# Patient Record
(demographics unavailable — no encounter records)

---

## 2024-10-31 NOTE — PHYSICAL EXAM
[Normocephalic] : normocephalic [Atraumatic] : atraumatic [EOMI] : extra ocular movement intact [Sclera nonicteric] : sclera nonicteric [Supple] : supple [No Supraclavicular Adenopathy] : no supraclavicular adenopathy [No Cervical Adenopathy] : no cervical adenopathy [No Thyromegaly] : no thyromegaly [Examined in the supine and seated position] : examined in the supine and seated position [No Axillary Lymphadenopathy] : no left axillary lymphadenopathy [No Edema] : no edema [No Rashes] : no rashes [No Ulceration] : no ulceration [de-identified] : s/p bilateral mastectomies with prepectoral implant reconstruction. No breast masses. Upper mid sternal palpable mass palpable 3.8 x 3.2 cm without change, c/w biopsy proven metastasis.  [de-identified] : Mediport of right upper chest wall.

## 2024-10-31 NOTE — HISTORY OF PRESENT ILLNESS
[FreeTextEntry1] : Patient is a 62 year old female here today for a follow up office visit No family history of breast cancer.  11/16/2009 s/p bilateral skin sparing total mastectomies with left axillary sentinel lymph node biopsy and bilateral implant reconstruction;  Path Stage I ([id number]) ER positive 98%/AR positive 95%/Mgu5qac negative.  12/1/2009 Oncotype Dx recurrence score 6 Formerly saw med onc Dr. Hunter; she took Tamoxifen 3/2010 underwent exchange of implants with Dr. Celaya.  12/5/2018 Genetic testing: APC heterozygous (increased risk colorectal cancer).  10/14/2020 Bilateral breast ultrasound: No suspicious abnormalities were seen sonographically in either breast. There are bilateral grossly intact silicone implants. Clinical correlation of both breasts is recommended. Bi-rads 1.  10/10/2022 Bilateral breast ultrasound:  Right breast- No suspicious abnormalities were seen sonographically in the right breast. The right implant appears intact.  Left breast- No suspicious abnormalities were seen sonographically in the left breast. There are areas of shadowing suggestive of the snowstorm effect obscuring the implant contour on the left most notable in the 2-3:00 location suggestive of probable extracapsular implant rupture. Clinical correlation and breast MRI would be prudent. Bi-rads 2.  Plastic Surgeon: Dr. Celaya, she had the bilateral prepectoral implant exchange 12/12/2022, saw him 4/2023. 6/2023 she noticed a golf-ball sized lump of her upper chest wall.  6/16/2023 Left ultrasound: The palpable area of concern corresponds to a lobulated hypoechoic mass which appears to be within muscle along the upper medial left chest wall. Further evaluation with a chest CT scan with contrast is recommended. 6/17/2023 CT chest: Intrathoracic lymphadenopathy, including conglomerate anterior mediastinal mass which involves the left internal mammary chain, and invades the overlying chest wall, and erodes the sternum. Multiple indeterminate lung nodules, largest 9 mm in the right lower lobe. 6/23/2023 - FNA/core chest wall lesion positive for metastatic carcinoma c/w breast primary; ER-/AR-/Her2 negative 6/29/2023 - PET/CT: hypermetabolic mediastinal/prevascular space shanae mass invading anterior chest wall and abutting sternum; hypermetabolic right hilar, left internal mammary and left para aortic nodes; hypermetabolic elongated density along posterolateral left breast implant; subcentimeter pulmonary nodules below PET resolution 3/2024 CT stable disease without size change to suggest progression of disease. (see report for details) 4/2024- MRI brain negative 4/17/24 CT Chest/Abd/Pelvis: stable disease (see report) Med Onc: Dr. Kelly, on chemotherapy with Trodelvy (ASCENT-03 trial) and Xgeva for bone metastases. The Xgeva has been on hold due to recent gum surgery. She sees her q 4 weeks. She states that she last had a CAT scan of her chest 9/2024 which reportedly revealed that the mediastinal mass is stable and that one of the lymph nodes resolved.  She denies any chest wall pain. She states that in 4/2024 she broke her right tibia when she fell at the gym.  She had a cast for 8 weeks.  She had a deep venous thrombosis for which she took Eliquis for 3 months.

## 2024-10-31 NOTE — PHYSICAL EXAM
[Normocephalic] : normocephalic [Atraumatic] : atraumatic [EOMI] : extra ocular movement intact [Sclera nonicteric] : sclera nonicteric [Supple] : supple [No Supraclavicular Adenopathy] : no supraclavicular adenopathy [No Cervical Adenopathy] : no cervical adenopathy [No Thyromegaly] : no thyromegaly [Examined in the supine and seated position] : examined in the supine and seated position [No Axillary Lymphadenopathy] : no left axillary lymphadenopathy [No Edema] : no edema [No Rashes] : no rashes [No Ulceration] : no ulceration [de-identified] : s/p bilateral mastectomies with prepectoral implant reconstruction. No breast masses. Upper mid sternal palpable mass palpable 3.8 x 3.2 cm without change, c/w biopsy proven metastasis.  [de-identified] : Mediport of right upper chest wall.

## 2024-10-31 NOTE — CONSULT LETTER
[Dear  ___] : Dear  [unfilled], [Courtesy Letter:] : I had the pleasure of seeing your patient, [unfilled], in my office today. [Please see my note below.] : Please see my note below. [Sincerely,] : Sincerely, [DrSj  ___] : Dr. FOSTER [DrSj ___] : Dr. FOSTER [FreeTextEntry3] : Susan M. Palleschi, MD, FACS Division of Breast Surgery Director, Breast Surgery [address] [address] (Phone) [phone] (Fax) [phone]

## 2024-10-31 NOTE — PHYSICAL EXAM
[Normocephalic] : normocephalic [Atraumatic] : atraumatic [EOMI] : extra ocular movement intact [Sclera nonicteric] : sclera nonicteric [Supple] : supple [No Supraclavicular Adenopathy] : no supraclavicular adenopathy [No Cervical Adenopathy] : no cervical adenopathy [No Thyromegaly] : no thyromegaly [Examined in the supine and seated position] : examined in the supine and seated position [No Axillary Lymphadenopathy] : no left axillary lymphadenopathy [No Edema] : no edema [No Rashes] : no rashes [No Ulceration] : no ulceration [de-identified] : s/p bilateral mastectomies with prepectoral implant reconstruction. No breast masses. Upper mid sternal palpable mass palpable 3.8 x 3.2 cm without change, c/w biopsy proven metastasis.  [de-identified] : Mediport of right upper chest wall.

## 2024-10-31 NOTE — HISTORY OF PRESENT ILLNESS
[FreeTextEntry1] : Patient is a 62 year old female here today for a follow up office visit No family history of breast cancer.  11/16/2009 s/p bilateral skin sparing total mastectomies with left axillary sentinel lymph node biopsy and bilateral implant reconstruction;  Path Stage I ([id number]) ER positive 98%/FL positive 95%/Lke9dgk negative.  12/1/2009 Oncotype Dx recurrence score 6 Formerly saw med onc Dr. Hunter; she took Tamoxifen 3/2010 underwent exchange of implants with Dr. Celaya.  12/5/2018 Genetic testing: APC heterozygous (increased risk colorectal cancer).  10/14/2020 Bilateral breast ultrasound: No suspicious abnormalities were seen sonographically in either breast. There are bilateral grossly intact silicone implants. Clinical correlation of both breasts is recommended. Bi-rads 1.  10/10/2022 Bilateral breast ultrasound:  Right breast- No suspicious abnormalities were seen sonographically in the right breast. The right implant appears intact.  Left breast- No suspicious abnormalities were seen sonographically in the left breast. There are areas of shadowing suggestive of the snowstorm effect obscuring the implant contour on the left most notable in the 2-3:00 location suggestive of probable extracapsular implant rupture. Clinical correlation and breast MRI would be prudent. Bi-rads 2.  Plastic Surgeon: Dr. Celaya, she had the bilateral prepectoral implant exchange 12/12/2022, saw him 4/2023. 6/2023 she noticed a golf-ball sized lump of her upper chest wall.  6/16/2023 Left ultrasound: The palpable area of concern corresponds to a lobulated hypoechoic mass which appears to be within muscle along the upper medial left chest wall. Further evaluation with a chest CT scan with contrast is recommended. 6/17/2023 CT chest: Intrathoracic lymphadenopathy, including conglomerate anterior mediastinal mass which involves the left internal mammary chain, and invades the overlying chest wall, and erodes the sternum. Multiple indeterminate lung nodules, largest 9 mm in the right lower lobe. 6/23/2023 - FNA/core chest wall lesion positive for metastatic carcinoma c/w breast primary; ER-/FL-/Her2 negative 6/29/2023 - PET/CT: hypermetabolic mediastinal/prevascular space shanae mass invading anterior chest wall and abutting sternum; hypermetabolic right hilar, left internal mammary and left para aortic nodes; hypermetabolic elongated density along posterolateral left breast implant; subcentimeter pulmonary nodules below PET resolution 3/2024 CT stable disease without size change to suggest progression of disease. (see report for details) 4/2024- MRI brain negative 4/17/24 CT Chest/Abd/Pelvis: stable disease (see report) Med Onc: Dr. Kelly, on chemotherapy with Trodelvy (ASCENT-03 trial) and Xgeva for bone metastases. The Xgeva has been on hold due to recent gum surgery. She sees her q 4 weeks. She states that she last had a CAT scan of her chest 9/2024 which reportedly revealed that the mediastinal mass is stable and that one of the lymph nodes resolved.  She denies any chest wall pain. She states that in 4/2024 she broke her right tibia when she fell at the gym.  She had a cast for 8 weeks.  She had a deep venous thrombosis for which she took Eliquis for 3 months.

## 2024-10-31 NOTE — HISTORY OF PRESENT ILLNESS
[FreeTextEntry1] : Patient is a 62 year old female here today for a follow up office visit No family history of breast cancer.  11/16/2009 s/p bilateral skin sparing total mastectomies with left axillary sentinel lymph node biopsy and bilateral implant reconstruction;  Path Stage I ([id number]) ER positive 98%/NC positive 95%/Ihi3bsf negative.  12/1/2009 Oncotype Dx recurrence score 6 Formerly saw med onc Dr. Hunter; she took Tamoxifen 3/2010 underwent exchange of implants with Dr. Celaya.  12/5/2018 Genetic testing: APC heterozygous (increased risk colorectal cancer).  10/14/2020 Bilateral breast ultrasound: No suspicious abnormalities were seen sonographically in either breast. There are bilateral grossly intact silicone implants. Clinical correlation of both breasts is recommended. Bi-rads 1.  10/10/2022 Bilateral breast ultrasound:  Right breast- No suspicious abnormalities were seen sonographically in the right breast. The right implant appears intact.  Left breast- No suspicious abnormalities were seen sonographically in the left breast. There are areas of shadowing suggestive of the snowstorm effect obscuring the implant contour on the left most notable in the 2-3:00 location suggestive of probable extracapsular implant rupture. Clinical correlation and breast MRI would be prudent. Bi-rads 2.  Plastic Surgeon: Dr. Celaya, she had the bilateral prepectoral implant exchange 12/12/2022, saw him 4/2023. 6/2023 she noticed a golf-ball sized lump of her upper chest wall.  6/16/2023 Left ultrasound: The palpable area of concern corresponds to a lobulated hypoechoic mass which appears to be within muscle along the upper medial left chest wall. Further evaluation with a chest CT scan with contrast is recommended. 6/17/2023 CT chest: Intrathoracic lymphadenopathy, including conglomerate anterior mediastinal mass which involves the left internal mammary chain, and invades the overlying chest wall, and erodes the sternum. Multiple indeterminate lung nodules, largest 9 mm in the right lower lobe. 6/23/2023 - FNA/core chest wall lesion positive for metastatic carcinoma c/w breast primary; ER-/NC-/Her2 negative 6/29/2023 - PET/CT: hypermetabolic mediastinal/prevascular space shanae mass invading anterior chest wall and abutting sternum; hypermetabolic right hilar, left internal mammary and left para aortic nodes; hypermetabolic elongated density along posterolateral left breast implant; subcentimeter pulmonary nodules below PET resolution 3/2024 CT stable disease without size change to suggest progression of disease. (see report for details) 4/2024- MRI brain negative 4/17/24 CT Chest/Abd/Pelvis: stable disease (see report) Med Onc: Dr. Kelly, on chemotherapy with Trodelvy (ASCENT-03 trial) and Xgeva for bone metastases. The Xgeva has been on hold due to recent gum surgery. She sees her q 4 weeks. She states that she last had a CAT scan of her chest 9/2024 which reportedly revealed that the mediastinal mass is stable and that one of the lymph nodes resolved.  She denies any chest wall pain. She states that in 4/2024 she broke her right tibia when she fell at the gym.  She had a cast for 8 weeks.  She had a deep venous thrombosis for which she took Eliquis for 3 months.

## 2024-10-31 NOTE — ASSESSMENT
[FreeTextEntry1] : History of left breast cancer, s/p bilateral mastectomies with implant reconstruction (2009) now metastatic disease Invading anterior chest wall abutting sternum and metastatic to right hilar, left internal mammary and left para aortic lymph nodes  1. Continue chemotherapy and follow up with Dr. Kelly 2. Follow up office visit 4/2025 3. Advised monthly self breast examinations and advised her to contact me if she has any concerns.  4. Gave her card for Dr. Tyler Pena, psychotherapist .

## 2024-10-31 NOTE — CONSULT LETTER
[Dear  ___] : Dear  [unfilled], [Courtesy Letter:] : I had the pleasure of seeing your patient, [unfilled], in my office today. [Please see my note below.] : Please see my note below. [Sincerely,] : Sincerely, [DrSj  ___] : Dr. FOSTER [DrSj ___] : Dr. FOSTER [FreeTextEntry3] : Susan M. Palleschi, MD, FACS Division of Breast Surgery Director, Breast Surgery [address] [address] (Phone) [phone] (Fax) [phone]

## 2024-10-31 NOTE — CONSULT LETTER
[Dear  ___] : Dear  [unfilled], [Courtesy Letter:] : I had the pleasure of seeing your patient, [unfilled], in my office today. [Please see my note below.] : Please see my note below. [Sincerely,] : Sincerely, [DrSj  ___] : Dr. FOSTER [DrSj ___] : Dr. FOSTER [FreeTextEntry3] : Susan M. Palleschi, MD, FACS Division of Breast Surgery Director, Breast Surgery [address] [address] (Phone) [phone] (Fax) [phone]

## 2025-04-28 NOTE — HISTORY OF PRESENT ILLNESS
[FreeTextEntry1] : Patient is a 63 year old female here today for a follow up office visit No family history of breast cancer.  11/16/2009 s/p bilateral skin sparing total mastectomies with left axillary sentinel lymph node biopsy and bilateral implant reconstruction;  Path Stage I ([id number]) ER positive 98%/MD positive 95%/Vvm0awa negative.  12/1/2009 Oncotype Dx recurrence score 6 Formerly saw med onc Dr. Hunter; she took Tamoxifen 3/2010 underwent exchange of implants with Dr. Celaya.  12/5/2018 Genetic testing: APC heterozygous (increased risk colorectal cancer).  10/14/2020 Bilateral breast ultrasound: No suspicious abnormalities were seen sonographically in either breast. There are bilateral grossly intact silicone implants. Clinical correlation of both breasts is recommended. Bi-rads 1.  10/10/2022 Bilateral breast ultrasound:  Right breast- No suspicious abnormalities were seen sonographically in the right breast. The right implant appears intact.  Left breast- No suspicious abnormalities were seen sonographically in the left breast. There are areas of shadowing suggestive of the snowstorm effect obscuring the implant contour on the left most notable in the 2-3:00 location suggestive of probable extracapsular implant rupture. Clinical correlation and breast MRI would be prudent. Bi-rads 2.  Plastic Surgeon: Dr. Celaya, she had the bilateral prepectoral implant exchange 12/12/2022, saw him 4/2023. 6/2023 she noticed a golf-ball sized lump of her upper chest wall.  6/16/2023 Left ultrasound: The palpable area of concern corresponds to a lobulated hypoechoic mass which appears to be within muscle along the upper medial left chest wall. Further evaluation with a chest CT scan with contrast is recommended. 6/17/2023 CT chest: Intrathoracic lymphadenopathy, including conglomerate anterior mediastinal mass which involves the left internal mammary chain, and invades the overlying chest wall, and erodes the sternum. Multiple indeterminate lung nodules, largest 9 mm in the right lower lobe. 6/23/2023 - FNA/core chest wall lesion positive for metastatic carcinoma c/w breast primary; ER-/MD-/Her2 negative 6/29/2023 - PET/CT: hypermetabolic mediastinal/prevascular space shanae mass invading anterior chest wall and abutting sternum; hypermetabolic right hilar, left internal mammary and left para aortic nodes; hypermetabolic elongated density along posterolateral left breast implant; subcentimeter pulmonary nodules below PET resolution 4/2024- MRI brain negative She states that in 4/2024 she broke her right tibia when she fell at the gym.  She had a cast for 8 weeks.  She had a deep venous thrombosis for which she took Eliquis for 3 months. Med Onc: Dr. Kelly, on chemotherapy with Trodelvy (ASCENT-03 trial) and Xgeva for bone metastases. She saw her 3/2025, she will see her later this week. Her chest CT C/A/P revealed mild enlargement of the mediastinal mass previous 5.2 x 2.4 cm increased to 5.6 x 2.9 cm (per Dr. Kelly's note). She will undergo a repeat CT on 5/7/2025.  Diagnosed with squamous cell carcinoma, had excision in 2/2025. over right upper inner chest  She sees Dr. Moeller in palliative care, using medical marijuana. Rad onc: She met with Dr. Kirill Winkler ([facility] – [place], [place]) to discuss the potential role of palliative radiation therapy to the sternum.  The patient states that because she is asymptomatic, Dr. Winkler is going to continue observation without treatment for now.

## 2025-04-28 NOTE — CONSULT LETTER
[Dear  ___] : Dear  [unfilled], [Courtesy Letter:] : I had the pleasure of seeing your patient, [unfilled], in my office today. [Please see my note below.] : Please see my note below. [Sincerely,] : Sincerely, [DrSj  ___] : Dr. FOSTER [DrSj ___] : Dr. FOSTER [___] : [unfilled] [FreeTextEntry3] : Susan M. Palleschi, MD, FACS Division of Breast Surgery Director, Breast Surgery [address] [address] (Phone) [phone] (Fax) [phone]

## 2025-04-28 NOTE — ASSESSMENT
[FreeTextEntry1] : History of left breast cancer, s/p bilateral mastectomies with implant reconstruction (2009) now metastatic disease Invading anterior chest wall abutting sternum and metastatic to right hilar, left internal mammary and left para aortic lymph nodes Clinically the sternal mass has enlarged, also increased in size on CT report per Dr. Kelly's note.  1. Continue chemotherapy and follow up with Dr. Kelly and palliative care 2. Follow up office visit 10/2025. 3. Advised monthly self breast examinations and advised her to contact me if she has any concerns.  4. She is scheduled to undergo a CT 5/2025.   The patient was seen and examined in the presence of Jane Kingsley NP. .

## 2025-04-28 NOTE — CONSULT LETTER
[Dear  ___] : Dear  [unfilled], [Courtesy Letter:] : I had the pleasure of seeing your patient, [unfilled], in my office today. [Please see my note below.] : Please see my note below. [Sincerely,] : Sincerely, [DrSj  ___] : Dr. FOSTER [DrSj ___] : Dr. FOSTER [___] : [unfilled] [FreeTextEntry3] : Susan M. Palleschi, MD, FACS Division of Breast Surgery Director, Breast Surgery [address] [address] (Phone) [phone] (Fax) [phone]

## 2025-04-28 NOTE — PHYSICAL EXAM
[Normocephalic] : normocephalic [Atraumatic] : atraumatic [EOMI] : extra ocular movement intact [Sclera nonicteric] : sclera nonicteric [Supple] : supple [No Supraclavicular Adenopathy] : no supraclavicular adenopathy [No Cervical Adenopathy] : no cervical adenopathy [No Thyromegaly] : no thyromegaly [Examined in the supine and seated position] : examined in the supine and seated position [No Axillary Lymphadenopathy] : no left axillary lymphadenopathy [No Edema] : no edema [No Rashes] : no rashes [No Ulceration] : no ulceration [de-identified] : s/p bilateral mastectomies with prepectoral implant reconstruction. No breast masses. Upper mid sternal palpable mass palpable 4.5 x 6.0 cm increased from 3.8 x 3.2 cm, c/w biopsy proven metastasis. Overlying skin is intact. [de-identified] : Mediport of right upper chest wall.

## 2025-04-28 NOTE — PHYSICAL EXAM
[Normocephalic] : normocephalic [Atraumatic] : atraumatic [EOMI] : extra ocular movement intact [Sclera nonicteric] : sclera nonicteric [Supple] : supple [No Supraclavicular Adenopathy] : no supraclavicular adenopathy [No Cervical Adenopathy] : no cervical adenopathy [No Thyromegaly] : no thyromegaly [Examined in the supine and seated position] : examined in the supine and seated position [No Axillary Lymphadenopathy] : no left axillary lymphadenopathy [No Edema] : no edema [No Rashes] : no rashes [No Ulceration] : no ulceration [de-identified] : s/p bilateral mastectomies with prepectoral implant reconstruction. No breast masses. Upper mid sternal palpable mass palpable 4.5 x 6.0 cm increased from 3.8 x 3.2 cm, c/w biopsy proven metastasis. Overlying skin is intact. [de-identified] : Mediport of right upper chest wall.

## 2025-04-28 NOTE — HISTORY OF PRESENT ILLNESS
[FreeTextEntry1] : Patient is a 63 year old female here today for a follow up office visit No family history of breast cancer.  11/16/2009 s/p bilateral skin sparing total mastectomies with left axillary sentinel lymph node biopsy and bilateral implant reconstruction;  Path Stage I ([id number]) ER positive 98%/AZ positive 95%/Qhi5doz negative.  12/1/2009 Oncotype Dx recurrence score 6 Formerly saw med onc Dr. Hunter; she took Tamoxifen 3/2010 underwent exchange of implants with Dr. Celaya.  12/5/2018 Genetic testing: APC heterozygous (increased risk colorectal cancer).  10/14/2020 Bilateral breast ultrasound: No suspicious abnormalities were seen sonographically in either breast. There are bilateral grossly intact silicone implants. Clinical correlation of both breasts is recommended. Bi-rads 1.  10/10/2022 Bilateral breast ultrasound:  Right breast- No suspicious abnormalities were seen sonographically in the right breast. The right implant appears intact.  Left breast- No suspicious abnormalities were seen sonographically in the left breast. There are areas of shadowing suggestive of the snowstorm effect obscuring the implant contour on the left most notable in the 2-3:00 location suggestive of probable extracapsular implant rupture. Clinical correlation and breast MRI would be prudent. Bi-rads 2.  Plastic Surgeon: Dr. Celaya, she had the bilateral prepectoral implant exchange 12/12/2022, saw him 4/2023. 6/2023 she noticed a golf-ball sized lump of her upper chest wall.  6/16/2023 Left ultrasound: The palpable area of concern corresponds to a lobulated hypoechoic mass which appears to be within muscle along the upper medial left chest wall. Further evaluation with a chest CT scan with contrast is recommended. 6/17/2023 CT chest: Intrathoracic lymphadenopathy, including conglomerate anterior mediastinal mass which involves the left internal mammary chain, and invades the overlying chest wall, and erodes the sternum. Multiple indeterminate lung nodules, largest 9 mm in the right lower lobe. 6/23/2023 - FNA/core chest wall lesion positive for metastatic carcinoma c/w breast primary; ER-/AZ-/Her2 negative 6/29/2023 - PET/CT: hypermetabolic mediastinal/prevascular space shanae mass invading anterior chest wall and abutting sternum; hypermetabolic right hilar, left internal mammary and left para aortic nodes; hypermetabolic elongated density along posterolateral left breast implant; subcentimeter pulmonary nodules below PET resolution 4/2024- MRI brain negative She states that in 4/2024 she broke her right tibia when she fell at the gym.  She had a cast for 8 weeks.  She had a deep venous thrombosis for which she took Eliquis for 3 months. Med Onc: Dr. Kelly, on chemotherapy with Trodelvy (ASCENT-03 trial) and Xgeva for bone metastases. She saw her 3/2025, she will see her later this week. Her chest CT C/A/P revealed mild enlargement of the mediastinal mass previous 5.2 x 2.4 cm increased to 5.6 x 2.9 cm (per Dr. Kelly's note). She will undergo a repeat CT on 5/7/2025.  Diagnosed with squamous cell carcinoma, had excision in 2/2025. over right upper inner chest  She sees Dr. Moeller in palliative care, using medical marijuana. Rad onc: She met with Dr. Kirill Winkler ([facility] – [place], [place]) to discuss the potential role of palliative radiation therapy to the sternum.  The patient states that because she is asymptomatic, Dr. Winkler is going to continue observation without treatment for now.